# Patient Record
Sex: MALE | Race: WHITE
[De-identification: names, ages, dates, MRNs, and addresses within clinical notes are randomized per-mention and may not be internally consistent; named-entity substitution may affect disease eponyms.]

---

## 2022-12-13 ENCOUNTER — HOSPITAL ENCOUNTER (EMERGENCY)
Dept: HOSPITAL 47 - EC | Age: 82
Discharge: HOME | End: 2022-12-13
Payer: MEDICARE

## 2022-12-13 VITALS — SYSTOLIC BLOOD PRESSURE: 126 MMHG | DIASTOLIC BLOOD PRESSURE: 78 MMHG | HEART RATE: 90 BPM

## 2022-12-13 VITALS — RESPIRATION RATE: 18 BRPM | TEMPERATURE: 98 F

## 2022-12-13 DIAGNOSIS — J44.9: ICD-10-CM

## 2022-12-13 DIAGNOSIS — R31.9: ICD-10-CM

## 2022-12-13 DIAGNOSIS — E11.9: ICD-10-CM

## 2022-12-13 DIAGNOSIS — F17.200: ICD-10-CM

## 2022-12-13 DIAGNOSIS — I10: ICD-10-CM

## 2022-12-13 DIAGNOSIS — R33.9: Primary | ICD-10-CM

## 2022-12-13 LAB
PH UR: 5 [PH] (ref 5–8)
RBC UR QL: >182 /HPF (ref 0–5)
SP GR UR: 1.02 (ref 1–1.03)
SQUAMOUS UR QL AUTO: <1 /HPF (ref 0–4)
UROBILINOGEN UR QL STRIP: <2 MG/DL (ref ?–2)
WBC # UR AUTO: 25 /HPF (ref 0–5)

## 2022-12-13 PROCEDURE — 51702 INSERT TEMP BLADDER CATH: CPT

## 2022-12-13 PROCEDURE — 81001 URINALYSIS AUTO W/SCOPE: CPT

## 2022-12-13 PROCEDURE — 87186 SC STD MICRODIL/AGAR DIL: CPT

## 2022-12-13 PROCEDURE — 87086 URINE CULTURE/COLONY COUNT: CPT

## 2022-12-13 PROCEDURE — 99284 EMERGENCY DEPT VISIT MOD MDM: CPT

## 2022-12-13 PROCEDURE — 87077 CULTURE AEROBIC IDENTIFY: CPT

## 2022-12-13 NOTE — ED
Male Urogenital HPI





- General


Stated complaint: trouble urinating


Time Seen by Provider: 12/13/22 08:32


Source: patient, EMS


Mode of arrival: EMS


Limitations: no limitations





- History of Present Illness


Initial comments: 





82-year-old male with a history of diabetes history of BPH history of 

hypertension who was recently diagnosed with a urinary bladder stone 

approximately 9 mm in diameter who had a Graham catheter in place for about a 

week he was instructed to remove it yesterday but after removing and he was 

unable to urinate.  He states is been going on since around midnight last night.

 He states he has lower abdominal pain but no fevers chills sweats no nausea no 

vomiting.  He just feels like he's full again.





- Related Data


                                    Allergies











Allergy/AdvReac Type Severity Reaction Status Date / Time


 


No Known Allergies Allergy   Verified 12/13/22 10:07














Review of Systems


ROS Statement: 


Those systems with pertinent positive or pertinent negative responses have been 

documented in the HPI.





ROS Other: All systems not noted in ROS Statement are negative.





Past Medical History


Past Medical History: COPD, Diabetes Mellitus


History of Any Multi-Drug Resistant Organisms: None Reported


Past Surgical History: Orthopedic Surgery


Past Psychological History: No Psychological Hx Reported


Smoking Status: Current every day smoker


Past Alcohol Use History: None Reported


Past Drug Use History: None Reported





General Exam





- General Exam Comments


Initial Comments: 





This is a well-developed well-nourished awake alert oriented 5 female


Limitations: no limitations


General appearance: alert, anxious


Head exam: Present: atraumatic, normocephalic, normal inspection


Eye exam: Present: normal appearance, PERRL, EOMI.  Absent: scleral icterus, 

conjunctival injection, periorbital swelling


ENT exam: Present: normal exam, mucous membranes moist


Neck exam: Present: normal inspection, full ROM.  Absent: tenderness, 

meningismus, lymphadenopathy


Respiratory exam: Present: normal lung sounds bilaterally.  Absent: respiratory 

distress, wheezes, rales, rhonchi, stridor


Cardiovascular Exam: Present: regular rate, normal rhythm, normal heart sounds. 

Absent: systolic murmur, diastolic murmur, rubs, gallop, clicks


GI/Abdominal exam: Present: soft, distended (Distended urinary bladder), normal 

bowel sounds.  Absent: tenderness, guarding, rebound, rigid


 exam: Present: normal inspection


Extremities exam: Present: normal inspection, full ROM, normal capillary refill.

 Absent: tenderness, pedal edema, joint swelling, calf tenderness


Back exam: Present: normal inspection


Neurological exam: Present: alert, oriented X3, CN II-XII intact


Psychiatric exam: Present: normal affect, normal mood


Skin exam: Present: warm, dry, intact, normal color.  Absent: rash





Course


                                   Vital Signs











  12/13/22





  08:40


 


Temperature 98 F


 


Pulse Rate 68


 


Respiratory 18





Rate 


 


Blood Pressure 140/70


 


O2 Sat by Pulse 98





Oximetry 














- Reevaluation(s)


Reevaluation #1: 





12/13/22 09:19


Patient did get marked relief after catheter insertion with us far proximally 

1400 mL out of blood-tinged urine.  UA is pending.





Medical Decision Making





- Medical Decision Making





Patient initially improved this time the Graham catheter had been placed with or 

1400 mL of urine removed.  I did discuss case with the patient as well as with SHIMA Singer.  Patient is to be discharged and keep follow-up on 1216 and has 

planned outpatient.  In the meantime the patient will continue with the Graham 

catheter.  Patient does not meet admission criteria at this time.





- Lab Data


                                   Lab Results











  12/13/22 Range/Units





  08:53 


 


Urine Color  Yellow  


 


Urine Appearance  Cloudy  (Clear)  


 


Urine pH  5.0  (5.0-8.0)  


 


Ur Specific Gravity  1.016  (1.001-1.035)  


 


Urine Protein  Trace H  (Negative)  


 


Urine Glucose (UA)  Negative  (Negative)  


 


Urine Ketones  Negative  (Negative)  


 


Urine Blood  Large H  (Negative)  


 


Urine Nitrite  Negative  (Negative)  


 


Urine Bilirubin  Negative  (Negative)  


 


Urine Urobilinogen  <2.0  (<2.0)  mg/dL


 


Ur Leukocyte Esterase  Moderate H  (Negative)  


 


Urine RBC  >182 H  (0-5)  /hpf


 


Urine WBC  25 H  (0-5)  /hpf


 


Ur Squamous Epith Cells  <1  (0-4)  /hpf


 


Urine Bacteria  Occasional H  (None)  /hpf


 


Urine Mucus  Rare H  (None)  /hpf














Disposition


Clinical Impression: 


 Acute urinary retention, Hematuria





Disposition: HOME SELF-CARE


Condition: Good


Instructions (If sedation given, give patient instructions):  Urinary Retention 

in Men (ED)


Additional Instructions: 


Keep your appointment with urology as planned this coming Friday, December 16


Is patient prescribed a controlled substance at d/c from ED?: No


Referrals: 


Chayo Rand MD [Primary Care Provider] - 1-2 days


Mariano Singer MD [STAFF PHYSICIAN] - 1-2 days


Decision Date: 12/13/22


Decision Time: 10:10